# Patient Record
Sex: FEMALE | Race: WHITE | NOT HISPANIC OR LATINO
[De-identification: names, ages, dates, MRNs, and addresses within clinical notes are randomized per-mention and may not be internally consistent; named-entity substitution may affect disease eponyms.]

---

## 2018-07-13 ENCOUNTER — RESULT REVIEW (OUTPATIENT)
Age: 23
End: 2018-07-13

## 2019-05-30 PROBLEM — Z00.00 ENCOUNTER FOR PREVENTIVE HEALTH EXAMINATION: Status: ACTIVE | Noted: 2019-05-30

## 2019-06-04 ENCOUNTER — APPOINTMENT (OUTPATIENT)
Dept: OTOLARYNGOLOGY | Facility: CLINIC | Age: 24
End: 2019-06-04
Payer: COMMERCIAL

## 2019-06-04 VITALS — WEIGHT: 120 LBS | BODY MASS INDEX: 21.26 KG/M2 | HEIGHT: 63 IN

## 2019-06-04 DIAGNOSIS — Z86.69 PERSONAL HISTORY OF OTHER DISEASES OF THE NERVOUS SYSTEM AND SENSE ORGANS: ICD-10-CM

## 2019-06-04 DIAGNOSIS — Z78.9 OTHER SPECIFIED HEALTH STATUS: ICD-10-CM

## 2019-06-04 DIAGNOSIS — E73.9 LACTOSE INTOLERANCE, UNSPECIFIED: ICD-10-CM

## 2019-06-04 DIAGNOSIS — H90.2 CONDUCTIVE HEARING LOSS, UNSPECIFIED: ICD-10-CM

## 2019-06-04 DIAGNOSIS — Z80.9 FAMILY HISTORY OF MALIGNANT NEOPLASM, UNSPECIFIED: ICD-10-CM

## 2019-06-04 PROCEDURE — 92504 EAR MICROSCOPY EXAMINATION: CPT

## 2019-06-04 PROCEDURE — 99214 OFFICE O/P EST MOD 30 MIN: CPT

## 2019-06-04 PROCEDURE — 92567 TYMPANOMETRY: CPT

## 2019-06-04 PROCEDURE — 92557 COMPREHENSIVE HEARING TEST: CPT

## 2019-06-04 NOTE — PHYSICAL EXAM
[FreeTextEntry1] : Procedure: Microscopic Ear Exam\par \par Left ear:  \par Ear canal intact without inflammation or lesion.  \par Tympanic membrane intact without inflammation.\par \par \par Right ear:  \par Large central tympanic membrane perforation with severe retraction of the malleus handle noted. No inflammation or keratin in growth. [Normal] : lingual tonsils are normal [Midline] : trachea located in midline position

## 2019-06-04 NOTE — ASSESSMENT
[FreeTextEntry1] : Significant hearing loss related to right tympanic membrane perforation. Cannot rule out ossicular discontinuity. We carefully reviewed management options in detail.All risks limitations, complications and alternatives reviewed in detail.  Questions answered.\par \par CT scan of the temporal bone recommended.\par \par Surgical planning discussed in detail: Right tympanoplasty, possible ossicular chain reconstruction, possible mastoidectomy. Endaural or postauricular approach appears to be likely.

## 2019-06-04 NOTE — HISTORY OF PRESENT ILLNESS
[de-identified] : TONEY SUNG has a history of Otitis media and myringotomy procedure as a child. She underwent right tympanoplasty surgery for perforation and/or about 2010.\par \par June 4, 2019: Followup visit for persistent hearing loss in the right ear. No ear pain or otorrhea reported. Significant hearing loss noted with interference with communication.

## 2019-06-04 NOTE — CONSULT LETTER
[Please see my note below.] : Please see my note below. [FreeTextEntry2] : Dear ROMARIO CHAMPION  [FreeTextEntry1] : Thank you for allowing me to participate in the care of your patient.\par Please see the attached visit note.\par \par \par \par Tiago Winters\par Otology\par Department of Otolaryngology\par Health system\par

## 2019-06-04 NOTE — DATA REVIEWED
[de-identified] : Complete audiometry was ordered and completed today. This was separately reported by the audiologist. The results were reviewed in detail with the patient.\par \par

## 2019-09-06 ENCOUNTER — APPOINTMENT (OUTPATIENT)
Dept: OTOLARYNGOLOGY | Facility: AMBULATORY SURGERY CENTER | Age: 24
End: 2019-09-06

## 2019-09-06 ENCOUNTER — OUTPATIENT (OUTPATIENT)
Dept: OUTPATIENT SERVICES | Facility: HOSPITAL | Age: 24
LOS: 1 days | Discharge: HOME | End: 2019-09-06

## 2019-09-10 ENCOUNTER — APPOINTMENT (OUTPATIENT)
Dept: OTOLARYNGOLOGY | Facility: CLINIC | Age: 24
End: 2019-09-10

## 2020-01-06 ENCOUNTER — APPOINTMENT (OUTPATIENT)
Dept: OTOLARYNGOLOGY | Facility: CLINIC | Age: 25
End: 2020-01-06
Payer: COMMERCIAL

## 2020-01-06 VITALS
WEIGHT: 120 LBS | BODY MASS INDEX: 21.26 KG/M2 | SYSTOLIC BLOOD PRESSURE: 108 MMHG | HEART RATE: 58 BPM | DIASTOLIC BLOOD PRESSURE: 57 MMHG | HEIGHT: 63 IN

## 2020-01-06 PROCEDURE — 99214 OFFICE O/P EST MOD 30 MIN: CPT | Mod: 25

## 2020-01-06 PROCEDURE — 92567 TYMPANOMETRY: CPT

## 2020-01-06 PROCEDURE — 92557 COMPREHENSIVE HEARING TEST: CPT

## 2020-01-06 PROCEDURE — 92504 EAR MICROSCOPY EXAMINATION: CPT

## 2020-01-06 NOTE — DATA REVIEWED
[de-identified] : Complete audiometry was ordered and completed today. This was separately reported by the audiologist. The results were reviewed in detail with the patient.\par \par

## 2020-01-06 NOTE — PHYSICAL EXAM
[FreeTextEntry1] : Procedure: Microscopic Ear Exam\par \par Left ear:  \par Ear canal intact without inflammation or lesion.  \par Tympanic membrane intact without inflammation.\par \par \par Right ear:  \par Large central tympanic membrane perforation with severe retraction of the malleus handle noted. Mild resolving inflammation of the middle ear mucosa that is exposed. No keratin in growth. [Normal] : mucosa is normal [Midline] : trachea located in midline position

## 2020-01-06 NOTE — ASSESSMENT
[FreeTextEntry1] : Resolving acute inflammation of the right middle ear likely related to water contamination. I have recommended simple observation with early followup if symptoms recur. We discussed surgical planning for March 2020. Questions answered.

## 2020-01-06 NOTE — HISTORY OF PRESENT ILLNESS
[FreeTextEntry1] : 01/06/2020 \par Patient is being seen for an ear infection in the right ear.  Discomfort and drainage from the right ear 1 weeks ago possibly related to water sports activity.  Now better after treatment with oral and topical antibiotics.  [de-identified] : TONEY SUNG has a history of Otitis media and myringotomy procedure as a child. She underwent right tympanoplasty surgery for perforation and/or about 2010.\par \par June 4, 2019: Followup visit for persistent hearing loss in the right ear. No ear pain or otorrhea reported. Significant hearing loss noted with interference with communication.

## 2020-02-03 ENCOUNTER — APPOINTMENT (OUTPATIENT)
Dept: OTOLARYNGOLOGY | Facility: CLINIC | Age: 25
End: 2020-02-03

## 2020-03-05 ENCOUNTER — TRANSCRIPTION ENCOUNTER (OUTPATIENT)
Age: 25
End: 2020-03-05

## 2020-03-06 ENCOUNTER — APPOINTMENT (OUTPATIENT)
Dept: OTOLARYNGOLOGY | Facility: AMBULATORY SURGERY CENTER | Age: 25
End: 2020-03-06

## 2020-03-06 ENCOUNTER — OUTPATIENT (OUTPATIENT)
Dept: OUTPATIENT SERVICES | Facility: HOSPITAL | Age: 25
LOS: 1 days | Discharge: HOME | End: 2020-03-06
Payer: COMMERCIAL

## 2020-03-06 PROCEDURE — 69631 REPAIR EARDRUM STRUCTURES: CPT | Mod: RT

## 2020-03-06 PROCEDURE — 21235 EAR CARTILAGE GRAFT: CPT | Mod: 59,RT

## 2020-03-06 PROCEDURE — 95867 NDL EMG CRANIAL NRV MUSC UNI: CPT | Mod: 26

## 2020-03-10 ENCOUNTER — APPOINTMENT (OUTPATIENT)
Dept: OTOLARYNGOLOGY | Facility: CLINIC | Age: 25
End: 2020-03-10
Payer: COMMERCIAL

## 2020-03-10 PROCEDURE — 99024 POSTOP FOLLOW-UP VISIT: CPT

## 2020-03-10 NOTE — HISTORY OF PRESENT ILLNESS
[de-identified] : TONEY SUNG has a history of Otitis media and myringotomy procedure as a child. She underwent right tympanoplasty surgery for perforation and/or about 2010.\par \par \par March 6, 2020: Right tympanoplasty, cartilage graft. Severe tympanic membrane atelectasis and perforation, ossicles intact [FreeTextEntry1] : History\par Post operative visit.\par Reports mild pain.  No significant tinnitus.  No significant vertigo.  No bleeding reported.\par compliant with wound care\par \par Physical Exam\par \par Face: Normal Function bilaterally\par \par Oral: Normal\par \par Neck: No mass, Full range of motion\par \par \par Microscopic Ear Exam\par Right Ear:  Tragal incision intact, not inflammed Clean gelfoam fills the ear canal. No significant inflammation noted.  \par \par [place] Hearing Assessment\par 512 Hz:\par Parada test: referred to the right ear\par \par Postoperative instructions reviewed with the patient in detail. Followup plans discussed.\par

## 2020-04-06 ENCOUNTER — APPOINTMENT (OUTPATIENT)
Dept: OTOLARYNGOLOGY | Facility: CLINIC | Age: 25
End: 2020-04-06
Payer: COMMERCIAL

## 2020-04-06 PROCEDURE — 99024 POSTOP FOLLOW-UP VISIT: CPT

## 2020-04-06 NOTE — HISTORY OF PRESENT ILLNESS
[Home] : at home, [unfilled] , at the time of the visit. [Medical Office: ([facility])___] : at ~his/her~ medical office located in [place] [Patient] : the patient [FreeTextEntry2] : Visit initiated a patient request.  This audio/visual (using [facility]) visit is occurring during the state of emergency due to COVID-19 pandemic.  Governmental regulation is restricting travel and in person contact; and is recommending use of remote activities and telemedicine whenever possible.  I discussed with the patient the limitations of telemedicine encounters, including risks associated with the technology platform, technical difficulties, data security, and limited physical exam.  There is also a limitation in performing diagnostic procedures and the patient may need further testing and workup to arrive at a diagnosis and treatment plan.  We discussed that this will be billed as a visit.  TONEY SUNG understood and elected to proceed at  9:30AM   on 04/06/2020.  [de-identified] : 04/06/2020 \par We spoke at length regarding the patient's recovery from her ear surgery. She continues to feel slightly plugged in the ear with no pain or otorrhea. She is compliant with water protection and otic drops. We discussed continued wound care. I have recommended use of otic drops on an as-needed basis if there is discharge discomfort. She understands. She will continue with water protection.\par \par I have recommended a followup in one month's time.\par \par \par I have reviewed the management options in detail with the patient. I have recommended early followup if symptoms persist or progress.

## 2020-07-13 ENCOUNTER — APPOINTMENT (OUTPATIENT)
Dept: OTOLARYNGOLOGY | Facility: CLINIC | Age: 25
End: 2020-07-13
Payer: COMMERCIAL

## 2020-07-13 VITALS — HEIGHT: 63 IN | WEIGHT: 128 LBS | BODY MASS INDEX: 22.68 KG/M2

## 2020-07-13 DIAGNOSIS — H90.71 MIXED CONDUCTIVE AND SENSORINEURAL HEARING LOSS, UNILATERAL, RIGHT EAR, WITH UNRESTRICTED HEARING ON THE CONTRALATERAL SIDE: ICD-10-CM

## 2020-07-13 PROCEDURE — 92567 TYMPANOMETRY: CPT

## 2020-07-13 PROCEDURE — 99213 OFFICE O/P EST LOW 20 MIN: CPT | Mod: 25

## 2020-07-13 PROCEDURE — 92504 EAR MICROSCOPY EXAMINATION: CPT

## 2020-07-13 PROCEDURE — 92557 COMPREHENSIVE HEARING TEST: CPT

## 2020-07-13 NOTE — HISTORY OF PRESENT ILLNESS
[FreeTextEntry1] : 07/13/2020 \par Followup for right ear surgery. Delayed followup occurred be due to the covid pandemic.  She reports no ear pain or otorrhea. She is uncertain about hearing change. [de-identified] : TONEY SUNG has a history of Otitis media and myringotomy procedure as a child. She underwent right tympanoplasty surgery for perforation and/or about 2010.\par \par March 6, 2020: Right cartilage tympanoplasty

## 2020-07-13 NOTE — ASSESSMENT
[FreeTextEntry1] : Dry ear precautions recommended for the right ear. The tympanic membrane appears to be in better position following surgery. A small perforation remained. Discussed this in detail. Continued clinical monitoring advised.

## 2020-07-13 NOTE — DATA REVIEWED
[de-identified] : Complete audiometry was ordered and completed today. This was separately reported by the audiologist. The results were reviewed in detail with the patient.\par \par

## 2020-10-20 ENCOUNTER — TRANSCRIPTION ENCOUNTER (OUTPATIENT)
Age: 25
End: 2020-10-20

## 2020-11-24 ENCOUNTER — TRANSCRIPTION ENCOUNTER (OUTPATIENT)
Age: 25
End: 2020-11-24

## 2021-02-05 ENCOUNTER — EMERGENCY (EMERGENCY)
Facility: HOSPITAL | Age: 26
LOS: 1 days | Discharge: HOME | End: 2021-02-05
Admitting: EMERGENCY MEDICINE
Payer: COMMERCIAL

## 2021-02-05 VITALS
HEART RATE: 78 BPM | OXYGEN SATURATION: 99 % | TEMPERATURE: 98 F | DIASTOLIC BLOOD PRESSURE: 66 MMHG | SYSTOLIC BLOOD PRESSURE: 102 MMHG | RESPIRATION RATE: 17 BRPM

## 2021-02-05 DIAGNOSIS — Z20.822 CONTACT WITH AND (SUSPECTED) EXPOSURE TO COVID-19: ICD-10-CM

## 2021-02-05 PROCEDURE — 99282 EMERGENCY DEPT VISIT SF MDM: CPT

## 2021-02-05 NOTE — ED PROVIDER NOTE - PATIENT PORTAL LINK FT
You can access the FollowMyHealth Patient Portal offered by [facility] by registering at the following website: [email or web address]. By joining [employer]s FollowMyHealth portal, you will also be able to view your health information using other applications (apps) compatible with our system.

## 2021-02-05 NOTE — ED PROVIDER NOTE - OBJECTIVE STATEMENT
24 y/o F presents to the ED requesting to have testing done for COVID-19. Pt endorses she is asymptomatic at this time. Pt denies fevers, chills, coughs, CP, and SOB.

## 2021-02-06 LAB — SARS-COV-2 RNA SPEC QL NAA+PROBE: SIGNIFICANT CHANGE UP

## 2021-02-09 ENCOUNTER — EMERGENCY (EMERGENCY)
Facility: HOSPITAL | Age: 26
LOS: 1 days | Discharge: HOME | End: 2021-02-09
Admitting: EMERGENCY MEDICINE
Payer: COMMERCIAL

## 2021-02-09 VITALS
DIASTOLIC BLOOD PRESSURE: 65 MMHG | OXYGEN SATURATION: 100 % | HEART RATE: 59 BPM | RESPIRATION RATE: 16 BRPM | TEMPERATURE: 98 F | SYSTOLIC BLOOD PRESSURE: 101 MMHG

## 2021-02-09 DIAGNOSIS — Z20.822 CONTACT WITH AND (SUSPECTED) EXPOSURE TO COVID-19: ICD-10-CM

## 2021-02-09 PROBLEM — Z78.9 OTHER SPECIFIED HEALTH STATUS: Chronic | Status: ACTIVE | Noted: 2021-02-05

## 2021-02-09 PROCEDURE — 99282 EMERGENCY DEPT VISIT SF MDM: CPT

## 2021-02-09 NOTE — ED PROVIDER NOTE - PATIENT PORTAL LINK FT
You can access the FollowMyHealth Patient Portal offered by [facility] by registering at the following website: [email or web address]. By joining [employer]’s FollowMyHealth portal, you will also be able to view your health information using other applications (apps) compatible with our system.

## 2021-02-09 NOTE — ED ADULT TRIAGE NOTE - NS ED NURSE DIRECT TO ROOM YN
Spoke with parent identified patient using name and . Let parent know lab results, advised to cut back on fatty foods, soda, processed foods to reduce cholesterol. Mom voiced understanding.
No

## 2021-02-09 NOTE — ED PROVIDER NOTE - NSFOLLOWUPINSTRUCTIONS_ED_ALL_ED_FT
THE COVID 19 TEST RESULTS  - results may take up to 2-3 days to become available   - if you have consented, you will receive your results electronically   -  you can check [employer] [facility] or call [phone] to discuss your results with our nursing staff    Please continue to self quarantine (home isolation) until your result is back and follow instructions accordingly  - positive: complete home isolation for a total of 14 days since day of testing and no more fever with feeling back to baseline   - negative: you will be able to stop home isolation but still practice standard precautions, similar to how you would manage a regular flu/cold.    Return to ER for any worsening symptoms, such as persistent fever >100.4F, shortness of breath, coughing up bloody sputum, chest pain, lethargy, and fainting    Please remember to wash your hands frequently (>20 seconds each time), avoid touching your face, and cover your cough/sneeze.  Always wear a mask when you are outside of your home and practice social distancing.    Only take Tylenol for fever/pain control and avoid NSAIDs (ibuprofen/Advil/Aleve/naproxen) due to potential increased risk of exacerbating COVID-19 infection

## 2021-02-09 NOTE — ED PROVIDER NOTE - OBJECTIVE STATEMENT
26 y/o female presents to the ED requesting COVID-19 testing. Patient is currently asymptomatic and has no other medical complaints at this time. Denies fever, chills, chest pain, SOB, cough. 24 y/o female presents to the ED requesting COVID-19 testing. Patient is currently asymptomatic and has no other medical complaints at this time. Denies fever, chills, chest pain, SOB, cough. + exposure 1 week ago.

## 2021-02-10 LAB — SARS-COV-2 RNA SPEC QL NAA+PROBE: SIGNIFICANT CHANGE UP

## 2021-02-22 ENCOUNTER — EMERGENCY (EMERGENCY)
Facility: HOSPITAL | Age: 26
LOS: 1 days | Discharge: HOME | End: 2021-02-22
Attending: EMERGENCY MEDICINE | Admitting: EMERGENCY MEDICINE
Payer: COMMERCIAL

## 2021-02-22 VITALS
HEART RATE: 61 BPM | RESPIRATION RATE: 18 BRPM | OXYGEN SATURATION: 98 % | TEMPERATURE: 98 F | SYSTOLIC BLOOD PRESSURE: 109 MMHG | DIASTOLIC BLOOD PRESSURE: 67 MMHG

## 2021-02-22 DIAGNOSIS — Z20.822 CONTACT WITH AND (SUSPECTED) EXPOSURE TO COVID-19: ICD-10-CM

## 2021-02-22 PROCEDURE — 99282 EMERGENCY DEPT VISIT SF MDM: CPT

## 2021-02-22 NOTE — ED PROVIDER NOTE - NSFOLLOWUPINSTRUCTIONS_ED_ALL_ED_FT
THE COVID 19 TEST RESULTS  - results may take up to 2-3 days to become available   - if you have consented, you will receive your results electronically   -  you can check [employer] [facility] or call [phone] to discuss your results with our nursing staff    Please continue to self quarantine (home isolation) until your result is back and follow instructions accordingly  - positive: complete home isolation for a total of 14 days since day of testing and no more fever with feeling back to baseline   - negative: you will be able to stop home isolation but still practice standard precautions, similar to how you would manage a regular flu/cold.    Return to ER for any worsening symptoms, such as persistent fever >100.4F, shortness of breath, coughing up bloody sputum, chest pain, lethargy, and fainting    Please remember to wash your hands frequently (>20 seconds each time), avoid touching your face, and cover your cough/sneeze.  Always wear a mask when you are outside of your home and practice social distancing.    Only take Tylenol for fever/pain control and avoid NSAIDs (ibuprofen/Advil/Aleve/naproxen) due to potential increased risk of exacerbating COVID-19 infection

## 2021-02-22 NOTE — ED PROVIDER NOTE - PATIENT PORTAL LINK FT
You can access the FollowMyHealth Patient Portal offered by [facility] by registering at the following website: [email or web address]. By joining [employer]’s FollowMyHealth portal, you will also be able to view your health information using other applications (apps) compatible with our system.

## 2021-02-22 NOTE — ED PROVIDER NOTE - OBJECTIVE STATEMENT
24 y/o female presents to the ED requesting COVID-19 testing. Patient is currently asymptomatic and has no other medical complaints at this time. Denies fever, chills, chest pain, SOB, cough.

## 2021-02-23 ENCOUNTER — TRANSCRIPTION ENCOUNTER (OUTPATIENT)
Age: 26
End: 2021-02-23

## 2021-02-23 LAB — SARS-COV-2 RNA SPEC QL NAA+PROBE: SIGNIFICANT CHANGE UP

## 2021-03-08 ENCOUNTER — APPOINTMENT (OUTPATIENT)
Dept: OTOLARYNGOLOGY | Facility: CLINIC | Age: 26
End: 2021-03-08

## 2021-04-14 ENCOUNTER — APPOINTMENT (OUTPATIENT)
Dept: OTOLARYNGOLOGY | Facility: CLINIC | Age: 26
End: 2021-04-14
Payer: COMMERCIAL

## 2021-04-14 VITALS — HEIGHT: 63 IN | WEIGHT: 125 LBS | TEMPERATURE: 97.2 F | BODY MASS INDEX: 22.15 KG/M2

## 2021-04-14 PROCEDURE — 99072 ADDL SUPL MATRL&STAF TM PHE: CPT

## 2021-04-14 PROCEDURE — 99213 OFFICE O/P EST LOW 20 MIN: CPT | Mod: 25

## 2021-04-14 PROCEDURE — 92504 EAR MICROSCOPY EXAMINATION: CPT

## 2021-04-14 PROCEDURE — 92557 COMPREHENSIVE HEARING TEST: CPT

## 2021-04-14 PROCEDURE — 92567 TYMPANOMETRY: CPT

## 2021-04-14 RX ORDER — AMOXICILLIN 500 MG/1
CAPSULE ORAL
Refills: 0 | Status: COMPLETED | COMMUNITY
End: 2021-04-14

## 2021-04-14 RX ORDER — OFLOXACIN OTIC 3 MG/ML
0.3 SOLUTION AURICULAR (OTIC)
Qty: 2 | Refills: 5 | Status: DISCONTINUED | COMMUNITY
Start: 2020-03-10 | End: 2021-04-14

## 2021-04-14 RX ORDER — OFLOXACIN OTIC 3 MG/ML
0.3 SOLUTION AURICULAR (OTIC)
Qty: 1 | Refills: 1 | Status: DISCONTINUED | COMMUNITY
Start: 2020-04-06 | End: 2021-04-14

## 2021-04-14 RX ORDER — ACETAMINOPHEN AND CODEINE 300; 30 MG/1; MG/1
300-30 TABLET ORAL
Qty: 20 | Refills: 0 | Status: COMPLETED | COMMUNITY
Start: 2020-03-05 | End: 2021-04-14

## 2021-04-14 RX ORDER — CEFADROXIL 500 MG/1
500 CAPSULE ORAL TWICE DAILY
Qty: 10 | Refills: 0 | Status: COMPLETED | COMMUNITY
Start: 2020-03-05 | End: 2021-04-14

## 2021-04-14 NOTE — HISTORY OF PRESENT ILLNESS
[de-identified] : TONEY SUNG has a history of Otitis media and myringotomy procedure as a child. She underwent right tympanoplasty surgery for perforation and/or about 2010.\par \par March 6, 2020: Right cartilage tympanoplasty [FreeTextEntry1] : 04/14/2021 \par Patient reports of right ear popping No pain or otorrhea reported. Uncertain change in hearing.

## 2021-04-14 NOTE — ASSESSMENT
[FreeTextEntry1] : Stable appearing, right tympanic membrane perforation. Congestion and popping may be related to eustachian tube dysfunction. Conservative measures discussed. Continued clinical monitoring advised.

## 2021-04-14 NOTE — DATA REVIEWED
[de-identified] : Complete audiometry was ordered and completed today. I have interpreted these results and reviewed them in detail with the patient.\par \par conductive care, loss, right ear.

## 2021-04-14 NOTE — PHYSICAL EXAM
[Normal] : temporomandibular joint is normal [FreeTextEntry1] : Microscopic ear exam with cerumen debridement:\par \par Right ear: Obstructing cerumen was debrided from the ear canal using suction, and curet.  The ear canal was within normal limits. Inferior tympanic membrane perforation, 15%. No inflammation. The remainder of the tympanic membrane is replaced by cartilage and appears to be in good normal position.\par \par \par Left ear: The ear canal was patent and nonobstructed.  The tympanic membrane was intact and noninflamed.

## 2022-09-07 ENCOUNTER — APPOINTMENT (OUTPATIENT)
Dept: OTOLARYNGOLOGY | Facility: CLINIC | Age: 27
End: 2022-09-07

## 2022-09-07 VITALS — WEIGHT: 122 LBS | HEIGHT: 63 IN | BODY MASS INDEX: 21.62 KG/M2 | TEMPERATURE: 97.3 F

## 2022-09-07 DIAGNOSIS — H61.20 IMPACTED CERUMEN, UNSPECIFIED EAR: ICD-10-CM

## 2022-09-07 PROCEDURE — 69210 REMOVE IMPACTED EAR WAX UNI: CPT

## 2022-09-07 PROCEDURE — 99213 OFFICE O/P EST LOW 20 MIN: CPT | Mod: 25

## 2022-09-07 NOTE — ASSESSMENT
[FreeTextEntry1] : Persistent tympanic membrane perforation with no evidence of inflammation.  I have recommended repeat audiometry.  I have recommended water protection.

## 2022-09-07 NOTE — CONSULT LETTER
[Please see my note below.] : Please see my note below. [FreeTextEntry2] : Dear ROMARIO CHAMPION  [FreeTextEntry1] : Thank you for allowing me to participate in the care of TONEY SUNG .\par Please see the attached visit note.\par \par \par \par Tiago Winters\par Otology\par Medical Director of [facility]\par Department of Otolaryngology\par [facility]

## 2022-09-07 NOTE — HISTORY OF PRESENT ILLNESS
[de-identified] : TONEY SUNG has a history of Otitis media and myringotomy procedure as a child. She underwent right tympanoplasty surgery for perforation and/or about 2010.\par \par March 6, 2020: Right cartilage tympanoplasty [FreeTextEntry1] : 09/07/2022 \par PAtient reports of no significant changes in hearing. No Pain, No tinnitus, no otorrhea, and no dizziness.

## 2022-09-21 ENCOUNTER — APPOINTMENT (OUTPATIENT)
Dept: OTOLARYNGOLOGY | Facility: CLINIC | Age: 27
End: 2022-09-21

## 2022-09-21 VITALS — TEMPERATURE: 97.3 F | HEIGHT: 63 IN | WEIGHT: 122 LBS | BODY MASS INDEX: 21.62 KG/M2

## 2022-09-21 PROCEDURE — 99213 OFFICE O/P EST LOW 20 MIN: CPT

## 2022-09-21 PROCEDURE — 92557 COMPREHENSIVE HEARING TEST: CPT

## 2022-09-21 PROCEDURE — 92567 TYMPANOMETRY: CPT

## 2022-09-21 PROCEDURE — 92504 EAR MICROSCOPY EXAMINATION: CPT

## 2022-09-21 NOTE — HISTORY OF PRESENT ILLNESS
[de-identified] : TONEY SUNG has a history of Otitis media and myringotomy procedure as a child. She underwent right tympanoplasty surgery for perforation and/or about 2010.\par \par March 6, 2020: Right cartilage tympanoplasty [FreeTextEntry1] : 09/21/2022 \par PAtient reports of no significant changes in hearing. No Pain, No tinnitus, no otorrhea, and no dizziness.

## 2022-09-21 NOTE — ASSESSMENT
[FreeTextEntry1] : Conductive hearing loss associated with a tympanic membrane perforation status post surgery 2 years ago.  I have reviewed this with the patient in detail and carefully reviewed management options.  We discussed observation versus intervention.  Given the cause of her ear problems as eustachian tube dysfunction, we discussed the option of balloon dilation of the eustachian tubes followed by tympanoplasty revision.  This was discussed in detail.  All risks limitations, complications and alternatives reviewed in detail.  Questions answered.\par \par Continued clinical monitoring with water protection recommended.

## 2022-09-21 NOTE — PHYSICAL EXAM
[Normal] : temporomandibular joint is normal [FreeTextEntry1] : Procedure: Microscopic Ear Exam\par \par Left ear:  \par Ear canal intact without inflammation or lesion.  \par Tympanic membrane intact without inflammation.\par \par \par Right ear:  \par Ear canal intact without inflammation or lesion.  Inferior tympanic membrane perforation 15% with no surrounding inflammation.  The remaining portions of the tympanic membrane are normal in position and partially replaced by cartilage.

## 2022-09-21 NOTE — CONSULT LETTER
[Please see my note below.] : Please see my note below. [FreeTextEntry2] : Dear ROMARIO CHAMPION  [FreeTextEntry1] : Thank you for allowing me to participate in the care of TONEY SUNG .\par Please see the attached visit note.\par \par \par \par Tiago Winters\par Otology\par Medical Director of [facility]\par Department of Otolaryngology\par [facility]

## 2022-09-21 NOTE — DATA REVIEWED
[de-identified] : In light of the patients current symptoms, Complete audiometry was ordered and completed today. I have interpreted these results and reviewed them in detail with the patient.\par \par Moderate conductive hearing loss right ear

## 2023-06-07 ENCOUNTER — APPOINTMENT (OUTPATIENT)
Dept: OTOLARYNGOLOGY | Facility: CLINIC | Age: 28
End: 2023-06-07
Payer: COMMERCIAL

## 2023-06-07 VITALS — HEIGHT: 63 IN | BODY MASS INDEX: 21.26 KG/M2 | WEIGHT: 120 LBS

## 2023-06-07 DIAGNOSIS — H90.11 CONDUCTIVE HEARING LOSS, UNILATERAL, RIGHT EAR, WITH UNRESTRICTED HEARING ON THE CONTRALATERAL SIDE: ICD-10-CM

## 2023-06-07 DIAGNOSIS — H72.91 UNSPECIFIED PERFORATION OF TYMPANIC MEMBRANE, RIGHT EAR: ICD-10-CM

## 2023-06-07 PROCEDURE — 99213 OFFICE O/P EST LOW 20 MIN: CPT | Mod: 25

## 2023-06-07 PROCEDURE — 92567 TYMPANOMETRY: CPT

## 2023-06-07 PROCEDURE — 92504 EAR MICROSCOPY EXAMINATION: CPT

## 2023-06-07 PROCEDURE — 92557 COMPREHENSIVE HEARING TEST: CPT

## 2023-06-07 RX ORDER — NORETHINDRONE ACETATE AND ETHINYL ESTRADIOL, ETHINYL ESTRADIOL AND FERROUS FUMARATE 1MG-10(24)
KIT ORAL
Refills: 0 | Status: DISCONTINUED | COMMUNITY
End: 2023-06-07

## 2023-06-07 NOTE — ASSESSMENT
[FreeTextEntry1] : Management options reviewed in detail including surgical intervention for a right tympanic membrane perforation.  Continue clinical monitoring advised.  Water protection advised.

## 2023-06-07 NOTE — HISTORY OF PRESENT ILLNESS
[de-identified] : TONEY SUNG has a history of Otitis media and myringotomy procedure as a child. She underwent right tympanoplasty surgery for perforation and/or about 2010.\par \par March 6, 2020: Right cartilage tympanoplasty  [FreeTextEntry1] : 06/07/2023\par No ear pain reported.  No otorrhea reported.  No perceived change in hearing.

## 2023-06-07 NOTE — DATA REVIEWED
[de-identified] : In light of the patients current symptoms, Complete audiometry was ordered and completed today. I have interpreted these results and reviewed them in detail with the patient.\par \par Mild to moderate conductive hearing loss right ear.

## 2023-06-08 PROBLEM — H90.11 CONDUCTIVE HEARING LOSS OF RIGHT EAR WITH UNRESTRICTED HEARING OF LEFT EAR: Status: ACTIVE | Noted: 2019-06-04
